# Patient Record
Sex: MALE
[De-identification: names, ages, dates, MRNs, and addresses within clinical notes are randomized per-mention and may not be internally consistent; named-entity substitution may affect disease eponyms.]

---

## 2018-01-28 ENCOUNTER — CHARTING TRANS (OUTPATIENT)
Dept: OTHER | Age: 58
End: 2018-01-28

## 2018-01-28 ASSESSMENT — PAIN SCALES - GENERAL: PAINLEVEL_OUTOF10: 0

## 2018-11-02 VITALS
RESPIRATION RATE: 20 BRPM | HEART RATE: 99 BPM | WEIGHT: 155 LBS | BODY MASS INDEX: 21.7 KG/M2 | SYSTOLIC BLOOD PRESSURE: 130 MMHG | DIASTOLIC BLOOD PRESSURE: 86 MMHG | HEIGHT: 71 IN | TEMPERATURE: 97.9 F | OXYGEN SATURATION: 100 %

## 2022-10-03 ENCOUNTER — APPOINTMENT (OUTPATIENT)
Dept: URBAN - METROPOLITAN AREA CLINIC 246 | Age: 62
Setting detail: DERMATOLOGY
End: 2022-10-03

## 2022-10-03 DIAGNOSIS — D18.0 HEMANGIOMA: ICD-10-CM

## 2022-10-03 DIAGNOSIS — L64.8 OTHER ANDROGENIC ALOPECIA: ICD-10-CM

## 2022-10-03 DIAGNOSIS — L72.8 OTHER FOLLICULAR CYSTS OF THE SKIN AND SUBCUTANEOUS TISSUE: ICD-10-CM

## 2022-10-03 DIAGNOSIS — D22 MELANOCYTIC NEVI: ICD-10-CM

## 2022-10-03 DIAGNOSIS — L82.1 OTHER SEBORRHEIC KERATOSIS: ICD-10-CM

## 2022-10-03 PROBLEM — D18.01 HEMANGIOMA OF SKIN AND SUBCUTANEOUS TISSUE: Status: ACTIVE | Noted: 2022-10-03

## 2022-10-03 PROBLEM — D22.5 MELANOCYTIC NEVI OF TRUNK: Status: ACTIVE | Noted: 2022-10-03

## 2022-10-03 PROCEDURE — OTHER COUNSELING: OTHER

## 2022-10-03 PROCEDURE — OTHER MIPS QUALITY: OTHER

## 2022-10-03 PROCEDURE — 99203 OFFICE O/P NEW LOW 30 MIN: CPT

## 2022-10-03 ASSESSMENT — LOCATION SIMPLE DESCRIPTION DERM
LOCATION SIMPLE: LEFT UPPER BACK
LOCATION SIMPLE: RIGHT UPPER BACK
LOCATION SIMPLE: ANTERIOR SCALP
LOCATION SIMPLE: ABDOMEN

## 2022-10-03 ASSESSMENT — LOCATION ZONE DERM
LOCATION ZONE: SCALP
LOCATION ZONE: TRUNK

## 2022-10-03 ASSESSMENT — LOCATION DETAILED DESCRIPTION DERM
LOCATION DETAILED: LEFT MID-UPPER BACK
LOCATION DETAILED: EPIGASTRIC SKIN
LOCATION DETAILED: RIGHT INFERIOR MEDIAL UPPER BACK
LOCATION DETAILED: RIGHT SUPERIOR LATERAL UPPER BACK
LOCATION DETAILED: MID-FRONTAL SCALP

## 2025-04-23 ENCOUNTER — LAB ENCOUNTER (OUTPATIENT)
Dept: LAB | Age: 65
End: 2025-04-23
Attending: INTERNAL MEDICINE
Payer: COMMERCIAL

## 2025-04-23 ENCOUNTER — OFFICE VISIT (OUTPATIENT)
Dept: RHEUMATOLOGY | Facility: CLINIC | Age: 65
End: 2025-04-23
Payer: COMMERCIAL

## 2025-04-23 VITALS
DIASTOLIC BLOOD PRESSURE: 58 MMHG | RESPIRATION RATE: 14 BRPM | HEART RATE: 97 BPM | BODY MASS INDEX: 19.83 KG/M2 | OXYGEN SATURATION: 99 % | SYSTOLIC BLOOD PRESSURE: 100 MMHG | TEMPERATURE: 98 F | HEIGHT: 69.5 IN | WEIGHT: 137 LBS

## 2025-04-23 DIAGNOSIS — Z11.59 NEED FOR HEPATITIS B SCREENING TEST: ICD-10-CM

## 2025-04-23 DIAGNOSIS — Z11.1 SCREENING FOR TUBERCULOSIS: ICD-10-CM

## 2025-04-23 DIAGNOSIS — G72.9 MYOPATHY: ICD-10-CM

## 2025-04-23 DIAGNOSIS — R53.1 WEAKNESS: ICD-10-CM

## 2025-04-23 DIAGNOSIS — R63.4 UNINTENTIONAL WEIGHT LOSS OF 5% BODY WEIGHT OR LESS WITHIN 1 MONTH: ICD-10-CM

## 2025-04-23 DIAGNOSIS — I73.00 RAYNAUD'S DISEASE WITHOUT GANGRENE: ICD-10-CM

## 2025-04-23 DIAGNOSIS — R63.4 UNINTENTIONAL WEIGHT LOSS OF MORE THAN 10 POUNDS: ICD-10-CM

## 2025-04-23 DIAGNOSIS — G72.9 MYOPATHY: Primary | ICD-10-CM

## 2025-04-23 DIAGNOSIS — Z11.59 NEED FOR HEPATITIS C SCREENING TEST: ICD-10-CM

## 2025-04-23 DIAGNOSIS — N41.1 CHRONIC PROSTATITIS: ICD-10-CM

## 2025-04-23 DIAGNOSIS — I73.9 ARTERIAL INSUFFICIENCY OF LOWER EXTREMITY: ICD-10-CM

## 2025-04-23 PROBLEM — G47.33 OBSTRUCTIVE SLEEP APNEA SYNDROME: Status: ACTIVE | Noted: 2018-02-01

## 2025-04-23 PROBLEM — N18.31 STAGE 3A CHRONIC KIDNEY DISEASE (HCC): Status: ACTIVE | Noted: 2024-09-19

## 2025-04-23 PROBLEM — K57.30 DIVERTICULOSIS OF LARGE INTESTINE WITHOUT PERFORATION OR ABSCESS WITHOUT BLEEDING: Status: ACTIVE | Noted: 2025-04-23

## 2025-04-23 LAB
CK SERPL-CCNC: 59 U/L (ref 46–171)
CRP SERPL-MCNC: <0.4 MG/DL (ref ?–0.5)
ERYTHROCYTE [SEDIMENTATION RATE] IN BLOOD: 11 MM/HR (ref 0–20)
HCV AB SERPL QL IA: NONREACTIVE
LDH SERPL L TO P-CCNC: 176 U/L (ref 120–246)
TSI SER-ACNC: 0.76 UIU/ML (ref 0.55–4.78)
URATE SERPL-MCNC: 7.2 MG/DL (ref 3.7–9.2)

## 2025-04-23 PROCEDURE — 83516 IMMUNOASSAY NONANTIBODY: CPT

## 2025-04-23 PROCEDURE — 82746 ASSAY OF FOLIC ACID SERUM: CPT | Performed by: INTERNAL MEDICINE

## 2025-04-23 PROCEDURE — 84443 ASSAY THYROID STIM HORMONE: CPT | Performed by: INTERNAL MEDICINE

## 2025-04-23 PROCEDURE — 83615 LACTATE (LD) (LDH) ENZYME: CPT | Performed by: INTERNAL MEDICINE

## 2025-04-23 PROCEDURE — 84207 ASSAY OF VITAMIN B-6: CPT

## 2025-04-23 PROCEDURE — 84165 PROTEIN E-PHORESIS SERUM: CPT

## 2025-04-23 PROCEDURE — 99205 OFFICE O/P NEW HI 60 MIN: CPT | Performed by: INTERNAL MEDICINE

## 2025-04-23 PROCEDURE — 82306 VITAMIN D 25 HYDROXY: CPT

## 2025-04-23 PROCEDURE — 86225 DNA ANTIBODY NATIVE: CPT

## 2025-04-23 PROCEDURE — 86480 TB TEST CELL IMMUN MEASURE: CPT | Performed by: INTERNAL MEDICINE

## 2025-04-23 PROCEDURE — 86038 ANTINUCLEAR ANTIBODIES: CPT

## 2025-04-23 PROCEDURE — 82728 ASSAY OF FERRITIN: CPT | Performed by: INTERNAL MEDICINE

## 2025-04-23 PROCEDURE — 86364 TISS TRNSGLTMNASE EA IG CLAS: CPT

## 2025-04-23 PROCEDURE — 83516 IMMUNOASSAY NONANTIBODY: CPT | Performed by: INTERNAL MEDICINE

## 2025-04-23 PROCEDURE — 83521 IG LIGHT CHAINS FREE EACH: CPT

## 2025-04-23 PROCEDURE — 86706 HEP B SURFACE ANTIBODY: CPT | Performed by: INTERNAL MEDICINE

## 2025-04-23 PROCEDURE — 82784 ASSAY IGA/IGD/IGG/IGM EACH: CPT

## 2025-04-23 PROCEDURE — 83550 IRON BINDING TEST: CPT | Performed by: INTERNAL MEDICINE

## 2025-04-23 PROCEDURE — 84550 ASSAY OF BLOOD/URIC ACID: CPT | Performed by: INTERNAL MEDICINE

## 2025-04-23 PROCEDURE — 87340 HEPATITIS B SURFACE AG IA: CPT | Performed by: INTERNAL MEDICINE

## 2025-04-23 PROCEDURE — 86334 IMMUNOFIX E-PHORESIS SERUM: CPT

## 2025-04-23 PROCEDURE — 86140 C-REACTIVE PROTEIN: CPT

## 2025-04-23 PROCEDURE — 82085 ASSAY OF ALDOLASE: CPT | Performed by: INTERNAL MEDICINE

## 2025-04-23 PROCEDURE — 82550 ASSAY OF CK (CPK): CPT | Performed by: INTERNAL MEDICINE

## 2025-04-23 PROCEDURE — 82607 VITAMIN B-12: CPT | Performed by: INTERNAL MEDICINE

## 2025-04-23 PROCEDURE — 86704 HEP B CORE ANTIBODY TOTAL: CPT | Performed by: INTERNAL MEDICINE

## 2025-04-23 PROCEDURE — 85652 RBC SED RATE AUTOMATED: CPT

## 2025-04-23 PROCEDURE — 36415 COLL VENOUS BLD VENIPUNCTURE: CPT

## 2025-04-23 PROCEDURE — 86803 HEPATITIS C AB TEST: CPT | Performed by: INTERNAL MEDICINE

## 2025-04-23 PROCEDURE — 86235 NUCLEAR ANTIGEN ANTIBODY: CPT | Performed by: INTERNAL MEDICINE

## 2025-04-23 PROCEDURE — 83540 ASSAY OF IRON: CPT | Performed by: INTERNAL MEDICINE

## 2025-04-23 RX ORDER — DULOXETIN HYDROCHLORIDE 30 MG/1
30 CAPSULE, DELAYED RELEASE ORAL DAILY
COMMUNITY
Start: 2023-10-12

## 2025-04-23 RX ORDER — ONDANSETRON 4 MG/1
4 TABLET, ORALLY DISINTEGRATING ORAL
COMMUNITY
Start: 2025-02-18

## 2025-04-23 RX ORDER — MULTIVITAMIN
1 TABLET ORAL DAILY
COMMUNITY

## 2025-04-23 RX ORDER — DICYCLOMINE HCL 20 MG
TABLET ORAL
COMMUNITY
Start: 2025-02-18

## 2025-04-23 RX ORDER — TADALAFIL 5 MG/1
2.5 TABLET ORAL AS NEEDED
COMMUNITY
Start: 2023-11-08

## 2025-04-23 RX ORDER — MELOXICAM 15 MG/1
15 TABLET ORAL DAILY
COMMUNITY
Start: 2023-07-17

## 2025-04-23 RX ORDER — METRONIDAZOLE 500 MG/1
TABLET ORAL
COMMUNITY
Start: 2025-02-18

## 2025-04-23 NOTE — PATIENT INSTRUCTIONS
Consider tadalafil 2.5 mg daily as needed for prostatis and Raynaud's     Given weight loss of 15 lbs: get colonoscopy   -If negative consider CT of the chest with contrast. Let me know, and I can order this in the future     -EMG/NCS of the bilateral arms to evaluate peripheral nerve entrapment (e.g. carpal/cubital tunnel) vs plexopathy vs radiculopathy    [facility]  [address] [place] [address] [phone]    Get arterial duplex ultrasound to evaluate for any decrease in blood flow to the legs

## 2025-04-23 NOTE — PROGRESS NOTES
The following individual(s) verbally consented to be recorded using ambient AI listening technology and understand that they can each withdraw their consent to this listening technology at any point by asking the clinician to turn off or pause the recording:    Patient name: Samuel Coffey  Additional names:  DONTRELL

## 2025-04-23 NOTE — PROGRESS NOTES
Rheumatology New Patient Note  =====================================================================================================    Chief complaint: raynauds, myopathy  Chief Complaint   Patient presents with    New Patient     Establish care. Consult for chronic (about 5yrs) of monse being painfully cold and numb, even during nicer weather.  Pt has noticed muscle/weight loss in the last 5yrs. Pt also has joint pains in the shoulder, knee, elbow, and ankle.    Rapid 3 score - 1.7    Pt consents to [facility]IDGE recording.     Referring (will send letter)  PCP  Jesus Alberto Zuluaga MD  Fax: [phone]  Phone: [phone]    =====================================================================================================  HPI Date of visit: 4/23/2025    Samuel Coffey is a 64 year old male     Samuel Coffey is a 64 year old male who presents with muscle weakness and cold extremities.    Long history of Raynaud's in the hands for several decades.  Noted some symptoms perhaps even his 20s.  He has experienced worsening cold feet and hands for 5 to 10 years, with symptoms worsening in the winter. His toes are painfully cold, even when wearing double socks, while his fingers are cold but less bothersome. No significant relief has been found from any treatments tried.    Over the past 5 years, he has noticed muscle loss, particularly in his forearms and shoulders, leading to weakness and difficulty lifting weights he previously managed easily. He has not undergone nerve studies or EMG tests. No family history of muscular dystrophy or muscle disorders. He denies numbness or tingling in his hands, except when they are extremely cold.    He has a history of multiple muscle tears, including torn rotator cuffs in both shoulders, for which he had surgery. Additionally, he has a meniscus tear in his left knee, which was scheduled for surgery but postponed due to a recent episode of diverticulitis. He is currently  undergoing physical therapy for his knee and elbow, which was diagnosed as tennis elbow.    He has chronic prostatitis, treated with tamsulosin and Cialis, but experienced side effects such as lightheadedness and stomach upset, respectively. He has not taken Cialis for several months.    He has a history of diverticulitis, diagnosed after a recent episode of abdominal pain and fever, leading to a scheduled colonoscopy. He reports significant weight loss from 150 lbs to 134 lbs over the past year. No conditions have been diagnosed to explain this weight loss.  - 2025 CT abdomen/pelvis without any radiographic features of malignancy.    He has mild chronic kidney disease    -works in office, retired    14 point ROS negative except noted above    Medications:  Current Medications[1]    Past Medical History:  Past Medical History[2]  Past Surgical History:  Past Surgical History[3]  Family History:  Family History[4]  Social History:  Short Social Hx on File[5]  ?  Allergies:  Allergies[6]      Objective    Vitals:    04/23/25 1310   BP: 100/58   Pulse: 97   Resp: 14   Temp: 97.7 °F (36.5 °C)   TempSrc: Temporal   SpO2: 99%   Weight: 137 lb (62.1 kg)   Height: 5' 9.5\" (1.765 m)       GEN: NAD, well-nourished.   HEENT: Head: NCAT. Face: No lesions. Eyes: Conjunctiva clear. Sclera are anicteric. PERRLA. EOMs are full. Ears: The right and left ear canals are clear.  Nose: No external or internal nasal deformities. Nasal septum is midline. Mouth: The lips are within normal limits.  No oral ulcers Tongue is midline with no lesions. The oral cavity is clear.   Neck: Supple. No neck masses. No thyromegaly. No LAD, parotid or submandicular gland palpated.   CV: RRR, no mrg, S1/S2  PULM: CTAB, no wrr, easy effort  Extremities: No cyanosis, edema or deformities.   Neurologic: Strength, CN2-12 grossly intact   Psych: normal affect.   Skin: No lesions or rashes.  MSK: 28 joint count performed. No evidence of synovitis in mcp, pip,  dip, wrist, elbows, shoulders, hips, knees, ankles, mtp unless otherwise noted. Full ROM of elbows, wrists, knees.    Hands- No ulceration/lesions noted. No swelling in IPJs, no TTP or synovitis noted in joints of hand   Wrists- No pain with wrist flexion and extension. No swelling, erythema, or increased warmth.   Elbows- No swelling, erythema, or increased warmth.   Shoulders- FROM, abduction ~180 degrees bilaterally.    Knees- No swelling, erythema, or increased warmth. AROM flexion/extension ~0-180 degrees.    No valgus/varus laxities appreciated.   Ankles/Feet- No swelling, erythema, or increased warmth.    Slight cyanosis of the bilateral toes    hip flex/ext, knee ext/flex, and foot ext/flex: 5/5  strength in shoulder abduction, elbow ext/flexion, finger flexion/extension, interossei strength, wrist flexion extension is 5/5      ?  Labs:  No results found for: \"WBC\", \"RBC\", \"HGB\", \"HCT\", \"PLT\", \"MPV\", \"MCV\", \"MCH\", \"MCHC\", \"RDW\", \"NEPRELIM\", \"NEUTABS\", \"LYMPHABS\", \"EOSABS\", \"BASABS\", \"NEUT\", \"LYMPH\", \"MON\", \"EOS\", \"BASO\", \"NEPERCENT\", \"LYPERCENT\", \"MOPERCENT\", \"EOPERCENT\", \"BAPERCENT\", \"NE\", \"LYMABS\", \"MOABSO\", \"EOABSO\", \"BAABSO\"  No results found for: \"GLU\", \"BUN\", \"BUNCREA\", \"CREATSERUM\", \"ANIONGAP\", \"GFR\", \"GFRNAA\", \"GFRAA\", \"CA\", \"OSMOCALC\", \"ALKPHO\", \"AST\", \"ALT\", \"ALKPHOS\", \"BILT\", \"TP\", \"ALB\", \"GLOBULIN\", \"AGRATIO\", \"NA\", \"K\", \"CL\", \"CO2\"      No results found for: \"ANATI\", \"YUNG\", \"ANAS\", \"ANASCRN\", \"ANASCRNRFLX\", \"MARITZA\"  No results found for: \"SSA\", \"SSAUR\", \"ANTISSA\", \"SSA52\", \"SSA60\", \"SSADD\", \"SSB\", \"ANTISSB\"  No results found for: \"DSDNA\", \"ANTIDSDNA\", \"SMUD\", \"ANTISM\", \"SM\", \"RNP\", \"ANTIRNP\", \"SMITHRNP\"  No results found for: \"SCL70\", \"SCL\", \"OQRLRBU43\"  No results found for: \"C3\", \"C4\"  No results found for: \"DRVVT\", \"LAINT\", \"PTTLUPUS\", \"LUPUSINTERP\", \"LA\", \"I9VH5BPCVD\", \"P9TW5UWMDV\", \"F4DMJDPQDD\", \"L1UYOKWCRX\"  No results found for: \"CARDIOLIPIGG\", \"CARDIOLIPIGM\", \"CARDIOLIPIGA\", \"CARDIOIGA\",  \"CARLIP\"      Additional Labs:    2/2025  Creat 1.25, rest of cmp wnl   Complete blood count with differential (CBC w/ diff) wnl     1/2024      Radiology:    2/2025 CT abd/pelvis  The liver, spleen, and pancreas are unremarkable. The kidneys enhance   normally. There is no hydronephrosis. Gallbladder is not distended. Small   bowel loops are nondistended. There is marked diverticular disease with   some wall thickening involving the midportion of the sigmoid colon. No   surrounding inflammatory changes are noted. Mild amount of stool seen   within the ascending colon. The appendix is unremarkable.     There are no free pelvic fluid collections.     Prostate gland is mildly enlarged. Degenerative changes are seen of the   L5-S1 level. Atherosclerotic disease is seen of the aorta. The major   mesenteric vessels are patent.     Radiology review:      =====================================================================================================  Assessment and Plan  Assessment:  1. Myopathy    2. Need for hepatitis B screening test    3. Need for hepatitis C screening test    4. Screening for tuberculosis    5. Raynaud's disease without gangrene    6. Arterial insufficiency of lower extremity    7. Weakness    8. Unintentional weight loss of more than 10 pounds    9. Chronic prostatitis      #Muscle weakness and atrophy: primarily in the BUE  #Weight loss: 15 pounds in the past year.  Unintentional.  Progressive weakness and atrophy in shoulders and arms over five years.  Patient notes objective drops in the amount of weight he is able to lift. Differential includes autoimmune diseases, neuromuscular disorders, other systemic issues  - Order blood work for muscle inflammation, thyroid function, vitamin levels, and autoimmune markers.  Include YUNG by MAXIMINO panel, myositis panel, HMGCR  - Muscle biomarkers including CK, LDH, aldolase  -Screening for hep B/C/HIV/LTBI/baseline pulmonary disease for high risk med  use use and monitoring for toxicity  , eval chronic viral inflammatory arthrits: Hepatitis B: sAB, cAB IgM AND Total, HBsAg, Hepatitis C AB, IGRA  - EMG/NCS of BUE to evaluate myopathy, peripheral nerve entrapment (e.g. carpal/cubital tunnel) vs plexopathy vs radiculopathy    #Weight loss  Unexplained weight loss from 150 lbs to 134 lbs over the past year. Differential includes malabsorption, malignancy, or systemic disease. Colonoscopy planned for gastrointestinal causes.  - If colonoscopy is negative, consider CT of the chest with contrast.  CT abdomen pelvis from earlier this year was negative for any malignancy  - Order celiac disease test, SPEP    #Raynaud's phenomenon: Affecting hands more so than the feet.  Likely primary Raynaud's phenomenon given he has had symptoms for up to 40 years.  #Peripheral arterial insufficiency: Query decrease in lower extremity arterial blood flow given chronically cool digits and mild cyanosis noted on examination.  - Consider resuming tadalafil post-colonoscopy which was prescribed for chronic prostatitis.  Discussed that tadalafil may help with Raynaud's as well.  - Order arterial duplex ultrasound for leg blood flow.    #Chronic kidney disease, stage 3  Chronic kidney disease with recent GFR improvement from 54 to 66.    #Diverticulitis  Recent diverticulitis episode diagnosed via CT scan.  - Proceed with scheduled colonoscopy on May 8th.    Meniscus tear  Right knee meniscus tear with improving symptoms under conservative management. Surgery postponed due to colonoscopy and symptom improvement.  - will be doing physical therapy for the knee.  ?  Plan:  Diagnoses and all orders for this visit:    Myopathy  -     CK (Creatine Kinase) (Not Creatinine)  -     LDH  -     Myositis Antibody Comprehensive Panel  -     Anti-HMGCR Antibody; Future  -     Uric Acid  -     Connective Tissue Disease (YUNG) Screen, Reflex Specific Antibody; Future  -     TSH W Reflex To Free T4  -     B12  AND FOLATE  -     Iron And Tibc  -     Ferritin  -     Aldolase  -     Vitamin D; Future  -     HCV Antibody  -     Hep B Core AB, Tot  -     Hepatitis B Surface Antibody  -     Hepatitis B Surface Antigen  -     Quantiferon TB Plus  -     EMG; Future  -     Vitamin B6; Future    Need for hepatitis B screening test  -     Hep B Core AB, Tot  -     Hepatitis B Surface Antibody  -     Hepatitis B Surface Antigen    Need for hepatitis C screening test  -     HCV Antibody    Screening for tuberculosis  -     Quantiferon TB Plus    Raynaud's disease without gangrene  -     CK (Creatine Kinase) (Not Creatinine)  -     LDH  -     Myositis Antibody Comprehensive Panel  -     Anti-HMGCR Antibody; Future  -     Uric Acid  -     Connective Tissue Disease (YUNG) Screen, Reflex Specific Antibody; Future  -     TSH W Reflex To Free T4  -     B12 AND FOLATE  -     Iron And Tibc  -     Ferritin  -     Aldolase  -     Vitamin D; Future  -     HCV Antibody  -     Hep B Core AB, Tot  -     Hepatitis B Surface Antibody  -     Hepatitis B Surface Antigen  -     Quantiferon TB Plus  -     US ART LOWER EXT BILAT DOPPLER W SEG PRESSURES (CPT=93923); Future    Arterial insufficiency of lower extremity  -     CK (Creatine Kinase) (Not Creatinine)  -     LDH  -     Myositis Antibody Comprehensive Panel  -     Anti-HMGCR Antibody; Future  -     Uric Acid  -     Connective Tissue Disease (YUNG) Screen, Reflex Specific Antibody; Future  -     TSH W Reflex To Free T4  -     B12 AND FOLATE  -     Iron And Tibc  -     Ferritin  -     Aldolase  -     Vitamin D; Future  -     HCV Antibody  -     Hep B Core AB, Tot  -     Hepatitis B Surface Antibody  -     Hepatitis B Surface Antigen  -     Quantiferon TB Plus  -     US ART LOWER EXT BILAT DOPPLER W SEG PRESSURES (CPT=93923); Future  -     Sed Rate, Westergren (Automated); Future  -     C-Reactive Protein; Future    Weakness  -     EMG; Future  -     US ART LOWER EXT BILAT DOPPLER W SEG PRESSURES  (CPT=93923); Future  -     Vitamin B6; Future  -     CELIAC DISEASE SCREEN Reflex; Future  -     Sed Rate, Westergren (Automated); Future  -     C-Reactive Protein; Future    Unintentional weight loss of more than 10 pounds  -     Monoclonal Protein Study; Future  -     CELIAC DISEASE SCREEN Reflex; Future  -     Sed Rate, Westergren (Automated); Future  -     C-Reactive Protein; Future    Chronic prostatitis        No follow-ups on file.      The above plan of care, diagnosis, orders, and follow-up were discussed with the patient. Questions related to this recommended plan of care were answered.    Thank you for referring this delightful patient to me. Please feel free to contact me with any questions.     This report was performed utilizing speech recognition software technology. Despite proofreading, speech recognition errors could escape detection. If a word or phrase is confusing or out of context, please do not hesitate to call for   clarification.       Kind regards      Vy Chaudhary MD  [facility]         [1]    Multiple Vitamin (ONE-DAILY MULTI VITAMINS) Oral Tab Take 1 tablet by mouth daily.      sertraline 50 MG Oral Tab Take 1 tablet (50 mg total) by mouth daily.      clonazePAM 0.5 MG Oral Tab       Omega-3 Fatty Acids (FISH OIL OR) Take by mouth.     [2]   Past Medical History:   BPH (benign prostatic hyperplasia)    Chronic prostatitis    Essential hypertension    Migraines   [3]   Past Surgical History:  Procedure Laterality Date    Other surgical history Bilateral     shoulder surgery   [4] History reviewed. No pertinent family history.  [5]   Social History  Socioeconomic History    Marital status:    Tobacco Use    Smoking status: Never    Smokeless tobacco: Never   Substance and Sexual Activity    Drug use: Never     Social Drivers of Health     Food Insecurity: No Food Insecurity (11/29/2024)    [facility]    NCSS - Food Insecurity     Worried About  Running Out of Food in the Last Year: No     Ran Out of Food in the Last Year: No   Transportation Needs: No Transportation Needs (11/29/2024)    Received from [facility] - Transportation     Lack of Transportation: No   Housing Stability: Not At Risk (11/29/2024)    Received from [facility] - Housing/Utilities     Has Housing: Yes     Worried About Losing Housing: No     Unable to Get Utilities: No   [6] No Known Allergies

## 2025-04-24 LAB
DEPRECATED HBV CORE AB SER IA-ACNC: 76 NG/ML (ref 50–336)
FOLATE SERPL-MCNC: 33.5 NG/ML (ref 5.4–?)
HBV CORE AB SERPL QL IA: NONREACTIVE
HBV SURFACE AB SER QL: NONREACTIVE
HBV SURFACE AB SERPL IA-ACNC: <3.1 MIU/ML
HBV SURFACE AG SER-ACNC: <0.1 [IU]/L
HBV SURFACE AG SERPL QL IA: NONREACTIVE
IGA SERPL-MCNC: 300.2 MG/DL (ref 70–312)
IRON SATN MFR SERPL: 22 % (ref 20–50)
IRON SERPL-MCNC: 61 UG/DL (ref 65–175)
TOTAL IRON BINDING CAPACITY: 274 UG/DL (ref 250–425)
TRANSFERRIN SERPL-MCNC: 213 MG/DL (ref 215–365)
VIT B12 SERPL-MCNC: 473 PG/ML (ref 211–911)
VIT D+METAB SERPL-MCNC: 49.3 NG/ML (ref 30–100)

## 2025-04-25 LAB
ALBUMIN SERPL ELPH-MCNC: 4.02 G/DL (ref 3.75–5.21)
ALBUMIN/GLOB SERPL: 1.62 {RATIO} (ref 1–2)
ALDOLASE: 3.5 U/L
ALPHA1 GLOB SERPL ELPH-MCNC: 0.24 G/DL (ref 0.19–0.46)
ALPHA2 GLOB SERPL ELPH-MCNC: 0.55 G/DL (ref 0.48–1.05)
B-GLOBULIN SERPL ELPH-MCNC: 0.81 G/DL (ref 0.68–1.23)
DSDNA IGG SERPL IA-ACNC: 2.1 IU/ML (ref ?–10)
ENA AB SER QL IA: 0.2 UG/L (ref ?–0.7)
ENA AB SER QL IA: NEGATIVE
GAMMA GLOB SERPL ELPH-MCNC: 0.88 G/DL (ref 0.62–1.7)
KAPPA LC FREE SER-MCNC: 2.39 MG/DL (ref 0.33–1.94)
KAPPA LC FREE/LAMBDA FREE SER NEPH: 1.16 {RATIO} (ref 0.26–1.65)
LAMBDA LC FREE SERPL-MCNC: 2.06 MG/DL (ref 0.57–2.63)
M TB IFN-G CD4+ T-CELLS BLD-ACNC: 0.03 IU/ML
M TB TUBERC IFN-G BLD QL: NEGATIVE
M TB TUBERC IGNF/MITOGEN IGNF CONTROL: 5.49 IU/ML
PROT SERPL-MCNC: 6.5 G/DL (ref 5.7–8.2)
QFT TB1 AG MINUS NIL: 0.01 IU/ML
QFT TB2 AG MINUS NIL: 0 IU/ML
TTG IGA SER-ACNC: 0.5 U/ML (ref ?–7)

## 2025-04-27 LAB — VITAMIN B6: 21.1 UG/L

## 2025-04-30 LAB — ANTI-HMGCR AB: <20 CU

## 2025-05-21 LAB
ANTI-EJ: NEGATIVE
ANTI-JO-1: <20 UNITS
ANTI-KU: NEGATIVE
ANTI-MDA-5: <20 UNITS
ANTI-MI-2 AB: NEGATIVE
ANTI-NXP-2: <20 UNITS
ANTI-OJ: NEGATIVE
ANTI-PL-12: NEGATIVE
ANTI-PL-7: NEGATIVE
ANTI-PM/SCL100: <20 UNITS
ANTI-SAE1: <20 UNITS
ANTI-SRP AB: NEGATIVE
ANTI-SSA 52KD IGG: <20 UNITS
ANTI-TIF-1GAMMA: <20 UNITS
ANTI-U1 RNP: <20 UNITS
ANTI-U2 RNP: NEGATIVE
ANTI-U3 RNP: NEGATIVE